# Patient Record
Sex: MALE | Race: BLACK OR AFRICAN AMERICAN | Employment: FULL TIME | URBAN - METROPOLITAN AREA
[De-identification: names, ages, dates, MRNs, and addresses within clinical notes are randomized per-mention and may not be internally consistent; named-entity substitution may affect disease eponyms.]

---

## 2024-01-04 ENCOUNTER — APPOINTMENT (OUTPATIENT)
Dept: GENERAL RADIOLOGY | Facility: HOSPITAL | Age: 24
End: 2024-01-04
Attending: STUDENT IN AN ORGANIZED HEALTH CARE EDUCATION/TRAINING PROGRAM
Payer: COMMERCIAL

## 2024-01-04 ENCOUNTER — HOSPITAL ENCOUNTER (EMERGENCY)
Facility: HOSPITAL | Age: 24
Discharge: HOME | End: 2024-01-04
Attending: STUDENT IN AN ORGANIZED HEALTH CARE EDUCATION/TRAINING PROGRAM
Payer: COMMERCIAL

## 2024-01-04 VITALS
WEIGHT: 315 LBS | RESPIRATION RATE: 22 BRPM | OXYGEN SATURATION: 99 % | DIASTOLIC BLOOD PRESSURE: 75 MMHG | HEIGHT: 70 IN | BODY MASS INDEX: 45.1 KG/M2 | TEMPERATURE: 98 F | HEART RATE: 102 BPM | SYSTOLIC BLOOD PRESSURE: 145 MMHG

## 2024-01-04 DIAGNOSIS — J06.9 VIRAL URI WITH COUGH: ICD-10-CM

## 2024-01-04 DIAGNOSIS — J45.21 MILD INTERMITTENT ASTHMA WITH EXACERBATION: Primary | ICD-10-CM

## 2024-01-04 LAB
FLUAV + FLUBV RNA SPEC NAA+PROBE: NEGATIVE
FLUAV + FLUBV RNA SPEC NAA+PROBE: NEGATIVE
RSV RNA SPEC NAA+PROBE: NEGATIVE
SARS-COV-2 RNA RESP QL NAA+PROBE: NOT DETECTED

## 2024-01-04 PROCEDURE — 0241U SARS-COV-2/FLU A AND B/RSV BY PCR (GENEXPERT): CPT | Performed by: STUDENT IN AN ORGANIZED HEALTH CARE EDUCATION/TRAINING PROGRAM

## 2024-01-04 PROCEDURE — 99284 EMERGENCY DEPT VISIT MOD MDM: CPT

## 2024-01-04 PROCEDURE — 0241U SARS-COV-2/FLU A AND B/RSV BY PCR (GENEXPERT): CPT

## 2024-01-04 PROCEDURE — 93010 ELECTROCARDIOGRAM REPORT: CPT

## 2024-01-04 PROCEDURE — 71045 X-RAY EXAM CHEST 1 VIEW: CPT | Performed by: STUDENT IN AN ORGANIZED HEALTH CARE EDUCATION/TRAINING PROGRAM

## 2024-01-04 PROCEDURE — 94640 AIRWAY INHALATION TREATMENT: CPT

## 2024-01-04 PROCEDURE — 93005 ELECTROCARDIOGRAM TRACING: CPT

## 2024-01-04 RX ORDER — PREDNISONE 50 MG/1
50 TABLET ORAL DAILY
Qty: 5 TABLET | Refills: 0 | Status: SHIPPED | OUTPATIENT
Start: 2024-01-04 | End: 2024-01-09

## 2024-01-04 RX ORDER — ALBUTEROL SULFATE 90 UG/1
2 AEROSOL, METERED RESPIRATORY (INHALATION) EVERY 4 HOURS PRN
Qty: 1 EACH | Refills: 0 | Status: SHIPPED | OUTPATIENT
Start: 2024-01-04 | End: 2024-02-03

## 2024-01-04 RX ORDER — ALBUTEROL SULFATE 2.5 MG/3ML
2.5 SOLUTION RESPIRATORY (INHALATION) EVERY 4 HOURS PRN
Qty: 30 EACH | Refills: 0 | Status: SHIPPED | OUTPATIENT
Start: 2024-01-04 | End: 2024-02-03

## 2024-01-04 RX ORDER — IPRATROPIUM BROMIDE AND ALBUTEROL SULFATE 2.5; .5 MG/3ML; MG/3ML
3 SOLUTION RESPIRATORY (INHALATION) EVERY 6 HOURS PRN
Status: DISCONTINUED | OUTPATIENT
Start: 2024-01-04 | End: 2024-01-05

## 2024-01-04 RX ORDER — GUAIFENESIN 600 MG/1
1200 TABLET, EXTENDED RELEASE ORAL 2 TIMES DAILY
Qty: 28 TABLET | Refills: 0 | Status: SHIPPED | OUTPATIENT
Start: 2024-01-04 | End: 2024-01-11

## 2024-01-04 RX ORDER — IPRATROPIUM BROMIDE AND ALBUTEROL SULFATE 2.5; .5 MG/3ML; MG/3ML
3 SOLUTION RESPIRATORY (INHALATION) ONCE
Status: COMPLETED | OUTPATIENT
Start: 2024-01-04 | End: 2024-01-04

## 2024-01-04 RX ORDER — PREDNISONE 20 MG/1
60 TABLET ORAL ONCE
Status: COMPLETED | OUTPATIENT
Start: 2024-01-04 | End: 2024-01-04

## 2024-01-05 LAB
ATRIAL RATE: 107 BPM
P AXIS: 57 DEGREES
P-R INTERVAL: 152 MS
Q-T INTERVAL: 340 MS
QRS DURATION: 100 MS
QTC CALCULATION (BEZET): 453 MS
R AXIS: 80 DEGREES
T AXIS: 22 DEGREES
VENTRICULAR RATE: 107 BPM

## 2024-01-05 NOTE — ED QUICK NOTES
PT to ED, steady gait, alert and orientated x4. Reporting MICHAELA and coughing x 6 days no relief from home nebulizer, albuterol inhaler, or Symbicort. Respiratory called for treatment as ordered in MAR.

## 2024-01-05 NOTE — ED PROVIDER NOTES
[place] Emergency Department Note  Patient: Pierce Ernandez Age: 23 year old Sex: male      MRN: [id number]  : 2000    Patient Seen in: [facility] Emergency Department    History     Chief Complaint   Patient presents with    Difficulty Breathing    Cough/URI     Stated Complaint: sore throat, congestion    History obtained from: Patient    23-year-old male with a past medical history of asthma presenting today for evaluation of cough, sore throat and congestion over the past week.  He states that he had a low-grade temperature of 99.9 at home.  States that he feels short of breath but denies any chest pain.  Denies any nausea, vomiting, diarrhea or abdominal pain.    Review of Systems:  Review of Systems  Positive for stated complaint: sore throat, congestion. Constitutional and vital signs reviewed. All other systems reviewed and negative except as noted above.    Patient History:  Past Medical History:   Diagnosis Date    Asthma        Past Surgical History:   Procedure Laterality Date    TONSILLECTOMY N/A         No family history on file.    Specific Social Determinants of Health:   Social History     Socioeconomic History    Marital status: Single   Tobacco Use    Smoking status: Never    Smokeless tobacco: Never   Vaping Use    Vaping Use: Never used   Substance and Sexual Activity    Alcohol use: Yes     Comment: occationally    Drug use: Never           PSFH elements reviewed from today and agreed except as otherwise stated in HPI.    Physical Exam     ED Triage Vitals [24 1842]   BP (!) 150/95   Pulse 111   Resp 25   Temp 98.3 °F (36.8 °C)   Temp src Temporal   SpO2 98 %   O2 Device None (Room air)       Current:/75   Pulse 102   Temp 98.3 °F (36.8 °C) (Temporal)   Resp 22   Ht 177.8 cm (5' 10\")   Wt (!) 172.4 kg   SpO2 99%   BMI 54.52 kg/m²         Physical Exam  Constitutional:       Appearance: He is well-developed. He is obese.   HENT:      Head: Normocephalic and  atraumatic.      Right Ear: External ear normal.      Left Ear: External ear normal.      Nose: Nose normal.   Eyes:      Conjunctiva/sclera: Conjunctivae normal.      Pupils: Pupils are equal, round, and reactive to light.   Cardiovascular:      Rate and Rhythm: Normal rate and regular rhythm.      Heart sounds: Normal heart sounds.   Pulmonary:      Effort: Pulmonary effort is normal.      Breath sounds: Wheezing present.   Abdominal:      General: Bowel sounds are normal.      Palpations: Abdomen is soft.      Tenderness: There is no abdominal tenderness.   Musculoskeletal:         General: Normal range of motion.      Cervical back: Normal range of motion and neck supple.   Skin:     General: Skin is warm and dry.      Findings: No rash.   Neurological:      General: No focal deficit present.      Mental Status: He is alert and oriented to person, place, and time.      Deep Tendon Reflexes: Reflexes are normal and symmetric.   Psychiatric:         Mood and Affect: Mood normal.         Behavior: Behavior normal.         ED Course   Labs:   Labs Reviewed   SARS-COV-2/FLU A AND B/RSV BY PCR (GENEXPERT) - Normal    Narrative:     This test is intended for the qualitative detection and differentiation of SARS-CoV-2, influenza A, influenza B, and respiratory syncytial virus (RSV) viral RNA in nasopharyngeal or nares swabs from individuals suspected of respiratory viral infection consistent with COVID-19 by their healthcare provider. Signs and symptoms of respiratory viral infection due to SARS-CoV-2, influenza, and RSV can be similar.    Test performed using the Xpert Xpress SARS-CoV-2/FLU/RSV (real time RT-PCR)  assay on the GeneXpert instrument, [employer], [place] [address].   This test is being used under the Food and Drug Administration's Emergency Use Authorization.    The authorized Fact Sheet for Healthcare Providers for this assay is available upon request from the laboratory.     Radiology findings:  I  personally reviewed the images.   XR CHEST AP PORTABLE  (CPT=71045)    Result Date: 1/4/2024  CONCLUSION:   No focal opacity, pleural effusion, or pneumothorax.   Scattered areas of peribronchial cuffing, which may reflect reactive airways disease/bronchitis.    Dictated by (CST): Paras Burk MD on 1/04/2024 at 9:11 PM     Finalized by (CST): Paras Burk MD on 1/04/2024 at 9:12 PM           EKG as interpreted by me: Ventricular rate 107, normal sinus tachycardia, normal axis, no parable prolongation, narrow QRS, QTc 453 ms, no ST segment elevations or depressions, T wave inversions in 3  Cardiac Monitor: Interpreted by me.   Pulse Readings from Last 1 Encounters:   01/04/24 102   , sinus,         MDM   23-year-old male with a past medical history of asthma presents today for evaluation of shortness of breath, cough, sore throat and congestion over the past 1 week.  Upon arrival emergency department, expiratory wheezing bilaterally with otherwise no increased work of breathing.    Differential diagnoses considered includes, but is not limited to: Asthma exacerbation, viral URI, bronchitis, pneumonia    Will obtain the following tests: Chest x-ray, COVID/flu/RSV panel  Please see ED course for my independent review of these tests/imaging results.    Initial Medications/Therapeutics administered: DuoNeb, prednisone    Chronic conditions affecting care: Asthma    Workup and medications considered but not ordered: None    Social Determinants of Health that impacted care: None     ED course: Independently reviewed the chest x-ray images that show no evidence of focal opacity to suggest pneumonia.  Overall consistent with viral process.  COVID/flu/RSV negative.  Following reevaluation after nebs, notes improvement of wheezing and shortness of breath.  Stable for discharge home at this time with continued albuterol MDI and nebs, prednisone, and cough medications.  Return precautions were provided and all questions  answered.  Patient patient's mother expressed understanding and agreement with this plan.      Disposition and Plan     Clinical Impression:  1. Mild intermittent asthma with exacerbation    2. Viral URI with cough        Disposition:  Discharge    Follow-up:  Ashly Pruett MD  [address] [place] [id number]  [phone]    Schedule an appointment as soon as possible for a visit in 2 day(s)        Medications Prescribed:  Discharge Medication List as of 1/4/2024 10:00 PM        START taking these medications    Details   albuterol 108 (90 Base) MCG/ACT Inhalation Aero Soln Inhale 2 puffs into the lungs every 4 (four) hours as needed for Wheezing., Normal, Disp-1 each, R-0      albuterol (2.5 MG/3ML) 0.083% Inhalation Nebu Soln Take 3 mL (2.5 mg total) by nebulization every 4 (four) hours as needed for Wheezing or Shortness of Breath., Normal, Disp-30 each, R-0      predniSONE 50 MG Oral Tab Take 1 tablet (50 mg total) by mouth daily for 5 days., Normal, Disp-5 tablet, R-0      guaiFENesin  MG Oral Tablet 12 Hr Take 2 tablets (1,200 mg total) by mouth 2 (two) times daily for 7 days., Normal, Disp-28 tablet, R-0               This note may have been created using voice dictation technology and may include inadvertent errors.      Malgorzata Suarez MD  Emergency Medicine

## 2024-01-05 NOTE — DISCHARGE INSTRUCTIONS
Thank you for seeking care at [facility] Emergency Department.  You have been seen, evaluated, and treated for an asthma exacerbation      We discussed the results of your workup   Please read the instructions provided   If given prescriptions, take as instructed    Return to the emergency department if you develop significant respiratory distress or increased work of breathing.  If you are having moderate difficulty breathing, please use your albuterol inhaler 4 puffs every 4 hrs for the next 2 days, then take every 6 hours for 2 days, then take as needed. If you are having severe difficulty breathing, you can use your albuterol inhaler 4 puffs every fifteen minutes as needed but you must return to the emergency department for further treatment if you are requiring this much medication. Take steroid medications if they were prescribed.     Remember, your care process does not end after your visit today. Please follow-up with your doctor within 1-2 days for a follow-up check to ensure you are  improving, to see if you need any further evaluation/testing, or to evaluate for any alternate diagnoses.    Please return to the emergency department if you develop worsening difficulty breathing, worsening cough, chest pain, fevers, dizziness or lightheadedness, swelling, vomiting, confusion, feeling ill or if you develop any other new or concerning symptoms as these could be signs of more serious medical illness.

## 2024-01-05 NOTE — ED INITIAL ASSESSMENT (HPI)
Pt arrives through triage with complaints of coughing/ sore throat/ congestion since this weekend. Reports temp of 99.9 at home. States some SOB when lays flat, denies CP        Hx of asthma

## 2024-01-10 ENCOUNTER — HOSPITAL ENCOUNTER (EMERGENCY)
Facility: HOSPITAL | Age: 24
Discharge: HOME | End: 2024-01-10
Attending: EMERGENCY MEDICINE
Payer: COMMERCIAL

## 2024-01-10 VITALS
OXYGEN SATURATION: 95 % | DIASTOLIC BLOOD PRESSURE: 100 MMHG | HEART RATE: 99 BPM | RESPIRATION RATE: 21 BRPM | SYSTOLIC BLOOD PRESSURE: 153 MMHG | BODY MASS INDEX: 55 KG/M2 | TEMPERATURE: 99 F | WEIGHT: 315 LBS

## 2024-01-10 DIAGNOSIS — J45.901 ASTHMATIC BRONCHITIS WITH ACUTE EXACERBATION, UNSPECIFIED ASTHMA SEVERITY, UNSPECIFIED WHETHER PERSISTENT: Primary | ICD-10-CM

## 2024-01-10 PROCEDURE — 94644 CONT INHLJ TX 1ST HOUR: CPT

## 2024-01-10 PROCEDURE — 99283 EMERGENCY DEPT VISIT LOW MDM: CPT

## 2024-01-10 PROCEDURE — 99284 EMERGENCY DEPT VISIT MOD MDM: CPT

## 2024-01-10 RX ORDER — ALBUTEROL SULFATE 90 UG/1
2 AEROSOL, METERED RESPIRATORY (INHALATION) EVERY 4 HOURS PRN
Qty: 1 EACH | Refills: 0 | Status: SHIPPED | OUTPATIENT
Start: 2024-01-10 | End: 2024-02-09

## 2024-01-10 RX ORDER — ALBUTEROL SULFATE 2.5 MG/3ML
2.5 SOLUTION RESPIRATORY (INHALATION) EVERY 4 HOURS PRN
Qty: 30 EACH | Refills: 0 | Status: SHIPPED | OUTPATIENT
Start: 2024-01-10 | End: 2024-02-09

## 2024-01-10 RX ORDER — ALBUTEROL SULFATE 2.5 MG/3ML
5 SOLUTION RESPIRATORY (INHALATION) ONCE
Status: COMPLETED | OUTPATIENT
Start: 2024-01-10 | End: 2024-01-10

## 2024-01-10 RX ORDER — PREDNISONE 20 MG/1
60 TABLET ORAL ONCE
Status: COMPLETED | OUTPATIENT
Start: 2024-01-10 | End: 2024-01-10

## 2024-01-10 RX ORDER — PREDNISONE 20 MG/1
60 TABLET ORAL DAILY
Qty: 15 TABLET | Refills: 0 | Status: SHIPPED | OUTPATIENT
Start: 2024-01-10 | End: 2024-01-15

## 2024-01-10 NOTE — ED PROVIDER NOTES
Patient Seen in: [facility] Emergency Department    History     Chief Complaint   Patient presents with    Cough     Stated Complaint: Cough     HPI    24 yo M with PMH asthma on symbicort, albuterol MDI/neb presenting with ongoing cough/ congestion since 1/4/2024 ED evaluation with unremarkable GeneXpert/CXR at that time for which patient discharge with 50mg prednisone. No chest pain. No fevers/chills. No sputum production.    Past Medical History:   Diagnosis Date    Asthma        Past Surgical History:   Procedure Laterality Date    TONSILLECTOMY N/A             History reviewed. No pertinent family history.    Social History     Socioeconomic History    Marital status: Single   Tobacco Use    Smoking status: Never    Smokeless tobacco: Never   Vaping Use    Vaping Use: Never used   Substance and Sexual Activity    Alcohol use: Yes     Comment: occationally    Drug use: Never       Review of Systems :  Constitutional: As per HPI   Respiratory: (+) cough and shortness of breath.      Positive for stated complaint: Cough  Other systems are as noted in HPI.  Constitutional and vital signs reviewed.      All other systems reviewed and negative except as noted above.    [facility] and agreed except as otherwise stated in HPI.    Physical Exam     ED Triage Vitals [01/10/24 1013]   BP (!) 165/96   Pulse 100   Resp 24   Temp 98.8 °F (37.1 °C)   Temp src Temporal   SpO2 97 %   O2 Device None (Room air)       Current:BP (!) 165/96   Pulse 100   Temp 98.8 °F (37.1 °C) (Temporal)   Resp 24   Wt (!) 172.4 kg   SpO2 97%   BMI 54.53 kg/m²         Physical Exam   Constitutional: No distress. Obese, subtly/intermittently tachypneic.  HEENT: MMM.  Head: Normocephalic.   Eyes: No injection.   Cardiovascular: RRR.   Pulmonary/Chest: Effort normal. Decreased aeration with expiratory wheezing.  Abdominal: Soft. Nontender.  Musculoskeletal: No gross deformity.  Neurological: Alert.   Skin: Skin is  warm.   Psychiatric: Cooperative.  Nursing note and vitals reviewed.        ED Course   Labs Reviewed - No data to display  XR CHEST AP PORTABLE  (CPT=71045)    Result Date: 1/4/2024  PROCEDURE: XR CHEST AP PORTABLE  (CPT=71045) TIME: 20:04.   COMPARISON: None.  INDICATIONS: Sore throat, congestion, and cough x6 days.  TECHNIQUE:   Single view.   FINDINGS:  CARDIAC/VASC: The cardiomediastinal silhouette is within normal limits of size. MEDIAST/GUSTABO:   No visible mass or adenopathy. LUNGS/PLEURA: No focal opacity, pleural effusion, or pneumothorax.  There is no evidence of pulmonary edema.  There are scattered areas of peribronchial cuffing. BONES: No fracture or visible bony lesion. OTHER: Negative.          CONCLUSION:   No focal opacity, pleural effusion, or pneumothorax.   Scattered areas of peribronchial cuffing, which may reflect reactive airways disease/bronchitis.    Dictated by (CST): Paras Burk MD on 1/04/2024 at 9:11 PM     Finalized by (CST): Paras Burk MD on 1/04/2024 at 9:12 PM           ED Course as of 01/10/24 1316  ------------------------------------------------------------  Time: 01/10 1229  Comment: Resting comfortably, wheezing improving.       MDM   DIFFERENTIAL DIAGNOSIS: After history and physical exam differential diagnosis includes but is not limited to asthma.    Pulse ox: 97%:Normal on RA, as interpreted by myself    Medical Decision Making  Evaluation for ongoing cough/wheezing after steroid burst in setting of nonacute CXR/GeneXpert - improved after 5mg/1mg albuterol/ipratropium neb with 60mg prednisone initiated; will discharge with MDI/neb refills, steroid burst and work note as requested.    Problems Addressed:  Asthmatic bronchitis with acute exacerbation, unspecified asthma severity, unspecified whether persistent: acute illness or injury    Amount and/or Complexity of Data Reviewed  External Data Reviewed: labs, radiology, ECG and notes.     Details: 1/4/2024 ED  notes/GeneXpert/CXR/EKG reviewed    Risk  Prescription drug management.    I was wearing at minimum a facemask and eye protection throughout this encounter with handwashing performed prior and after patient evaluation without personal hand/facial/oropharyngeal contact and gloves worn throughout encounter. See note and/or contact this provider for further PPE details.      Disposition and Plan     Clinical Impression:  1. Asthmatic bronchitis with acute exacerbation, unspecified asthma severity, unspecified whether persistent        Disposition:  Discharge    Follow-up:  Amandeep Coppola  [address] [facility] [place] [id number]  [phone]    Call  As needed for followup and re-evaluation.      Medications Prescribed:  Discharge Medication List as of 1/10/2024 12:46 PM        START taking these medications    Details   !! albuterol 108 (90 Base) MCG/ACT Inhalation Aero Soln Inhale 2 puffs into the lungs every 4 (four) hours as needed for Wheezing., Normal, Disp-1 each, R-0      !! albuterol (2.5 MG/3ML) 0.083% Inhalation Nebu Soln Take 3 mL (2.5 mg total) by nebulization every 4 (four) hours as needed for Wheezing or Shortness of Breath., Normal, Disp-30 each, R-0       !! - Potential duplicate medications found. Please discuss with provider.

## 2024-01-10 NOTE — ED INITIAL ASSESSMENT (HPI)
Pt with URI/asthma symptoms last week, treated in ED with steroids/inhaler. c/o lingering cough and chest congestion. Denies fever. Inhaler used with minimal relief.

## (undated) NOTE — LETTER
Date & Time: 1/10/2024, 12:43 PM  Patient: Pierce Ernandez  Encounter Provider(s):    Keny Goodwin MD       To Whom It May Concern:    Pierce Ernandez was seen and treated in our department on 1/10/2024. He should not return to work until 1/15/2024 .    If you have any questions or concerns, please do not hesitate to call.        _____________________________  Physician/APC Signature

## (undated) NOTE — LETTER
Date & Time: 1/4/2024, 9:47 PM  Patient: Pierce Ernandez  Encounter Provider(s):    Malgorzata Suaerz MD       To Whom It May Concern:    Pierce Ernandez was seen and treated in our department on 1/4/2024. He should not return to work until 1/8/24 .    If you have any questions or concerns, please do not hesitate to call.        _____________________________  Physician/APC Signature